# Patient Record
Sex: MALE
[De-identification: names, ages, dates, MRNs, and addresses within clinical notes are randomized per-mention and may not be internally consistent; named-entity substitution may affect disease eponyms.]

---

## 2022-10-27 ENCOUNTER — NON-APPOINTMENT (OUTPATIENT)
Age: 47
End: 2022-10-27

## 2022-10-27 PROBLEM — Z00.00 ENCOUNTER FOR PREVENTIVE HEALTH EXAMINATION: Status: ACTIVE | Noted: 2022-10-27

## 2022-10-28 ENCOUNTER — APPOINTMENT (OUTPATIENT)
Dept: UROLOGY | Facility: CLINIC | Age: 47
End: 2022-10-28

## 2022-10-28 VITALS
HEART RATE: 108 BPM | TEMPERATURE: 98.1 F | BODY MASS INDEX: 39.22 KG/M2 | HEIGHT: 70 IN | WEIGHT: 274 LBS | DIASTOLIC BLOOD PRESSURE: 81 MMHG | SYSTOLIC BLOOD PRESSURE: 137 MMHG

## 2022-10-28 PROCEDURE — 99204 OFFICE O/P NEW MOD 45 MIN: CPT

## 2022-10-31 NOTE — ASSESSMENT
[FreeTextEntry1] : microhematuria\par smokin ghistory\par negative CT scan\par for cysto\par UCX negative\par

## 2022-10-31 NOTE — HISTORY OF PRESENT ILLNESS
[FreeTextEntry1] : 47M referred with microhematuria\par marijuana smoke daily until 2 weeks ago\par no cigarette smoke\par +gross hemautira\par initially with dyusria\par CT urogram - no masses. negative \par creat 1.0\par RBC >50 on UA\par nocturia x 2\par gross hematuria continues and is terminal\par mild numbness in arm and leg

## 2022-10-31 NOTE — PHYSICAL EXAM
[Urethral Meatus] : meatus normal [Urinary Bladder Findings] : the bladder was normal on palpation [Scrotum] : the scrotum was normal [Epididymis] : the epididymides were normal [Testes Tenderness] : no tenderness of the testes [Testes Mass (___cm)] : there were no testicular masses